# Patient Record
Sex: FEMALE | Race: WHITE | Employment: FULL TIME | URBAN - METROPOLITAN AREA
[De-identification: names, ages, dates, MRNs, and addresses within clinical notes are randomized per-mention and may not be internally consistent; named-entity substitution may affect disease eponyms.]

---

## 2017-07-28 PROBLEM — R97.1 ELEVATED CA-125: Status: ACTIVE | Noted: 2017-07-28

## 2017-07-28 PROBLEM — R19.00 PELVIC MASS: Status: ACTIVE | Noted: 2017-07-28

## 2017-07-28 PROBLEM — N91.1 SECONDARY AMENORRHEA: Status: ACTIVE | Noted: 2017-07-28

## 2017-08-10 ENCOUNTER — ANESTHESIA EVENT (OUTPATIENT)
Dept: SURGERY | Facility: CLINIC | Age: 27
End: 2017-08-10
Payer: OTHER GOVERNMENT

## 2017-08-10 ENCOUNTER — HOSPITAL ENCOUNTER (OUTPATIENT)
Facility: CLINIC | Age: 27
Discharge: HOME | End: 2017-08-10
Attending: OBSTETRICS & GYNECOLOGY | Admitting: OBSTETRICS & GYNECOLOGY
Payer: OTHER GOVERNMENT

## 2017-08-10 ENCOUNTER — ANESTHESIA (OUTPATIENT)
Dept: SURGERY | Facility: CLINIC | Age: 27
End: 2017-08-10
Payer: OTHER GOVERNMENT

## 2017-08-10 ENCOUNTER — SURGERY (OUTPATIENT)
Age: 27
End: 2017-08-10

## 2017-08-10 VITALS
SYSTOLIC BLOOD PRESSURE: 131 MMHG | WEIGHT: 132.4 LBS | HEIGHT: 68 IN | RESPIRATION RATE: 12 BRPM | BODY MASS INDEX: 20.07 KG/M2 | DIASTOLIC BLOOD PRESSURE: 86 MMHG | TEMPERATURE: 98.3 F | OXYGEN SATURATION: 99 %

## 2017-08-10 DIAGNOSIS — R19.00 PELVIC MASS: Primary | ICD-10-CM

## 2017-08-10 LAB — HCG SERPL QL: NEGATIVE

## 2017-08-10 PROCEDURE — 25000128 H RX IP 250 OP 636: Performed by: OBSTETRICS & GYNECOLOGY

## 2017-08-10 PROCEDURE — 36415 COLL VENOUS BLD VENIPUNCTURE: CPT | Performed by: OBSTETRICS & GYNECOLOGY

## 2017-08-10 PROCEDURE — 88305 TISSUE EXAM BY PATHOLOGIST: CPT | Performed by: OBSTETRICS & GYNECOLOGY

## 2017-08-10 PROCEDURE — 71000013 ZZH RECOVERY PHASE 1 LEVEL 1 EA ADDTL HR: Performed by: OBSTETRICS & GYNECOLOGY

## 2017-08-10 PROCEDURE — 25000128 H RX IP 250 OP 636: Performed by: ANESTHESIOLOGY

## 2017-08-10 PROCEDURE — 71000027 ZZH RECOVERY PHASE 2 EACH 15 MINS: Performed by: OBSTETRICS & GYNECOLOGY

## 2017-08-10 PROCEDURE — 88305 TISSUE EXAM BY PATHOLOGIST: CPT | Mod: 26 | Performed by: OBSTETRICS & GYNECOLOGY

## 2017-08-10 PROCEDURE — 37000008 ZZH ANESTHESIA TECHNICAL FEE, 1ST 30 MIN: Performed by: OBSTETRICS & GYNECOLOGY

## 2017-08-10 PROCEDURE — 27211024 ZZHC OR SUPPLY OTHER OPNP: Performed by: OBSTETRICS & GYNECOLOGY

## 2017-08-10 PROCEDURE — 37000009 ZZH ANESTHESIA TECHNICAL FEE, EACH ADDTL 15 MIN: Performed by: OBSTETRICS & GYNECOLOGY

## 2017-08-10 PROCEDURE — 25000125 ZZHC RX 250

## 2017-08-10 PROCEDURE — 25000566 ZZH SEVOFLURANE, EA 15 MIN: Performed by: OBSTETRICS & GYNECOLOGY

## 2017-08-10 PROCEDURE — 25000125 ZZHC RX 250: Performed by: OBSTETRICS & GYNECOLOGY

## 2017-08-10 PROCEDURE — 25000132 ZZH RX MED GY IP 250 OP 250 PS 637: Performed by: NURSE PRACTITIONER

## 2017-08-10 PROCEDURE — 84703 CHORIONIC GONADOTROPIN ASSAY: CPT | Performed by: OBSTETRICS & GYNECOLOGY

## 2017-08-10 PROCEDURE — 36000087 ZZH SURGERY LEVEL 8 EA 15 ADDTL MIN: Performed by: OBSTETRICS & GYNECOLOGY

## 2017-08-10 PROCEDURE — 71000012 ZZH RECOVERY PHASE 1 LEVEL 1 FIRST HR: Performed by: OBSTETRICS & GYNECOLOGY

## 2017-08-10 PROCEDURE — 25800025 ZZH RX 258: Performed by: OBSTETRICS & GYNECOLOGY

## 2017-08-10 PROCEDURE — C1765 ADHESION BARRIER: HCPCS | Performed by: OBSTETRICS & GYNECOLOGY

## 2017-08-10 PROCEDURE — 27210995 ZZH RX 272: Performed by: OBSTETRICS & GYNECOLOGY

## 2017-08-10 PROCEDURE — 36000085 ZZH SURGERY LEVEL 8 1ST 30 MIN: Performed by: OBSTETRICS & GYNECOLOGY

## 2017-08-10 PROCEDURE — 40000170 ZZH STATISTIC PRE-PROCEDURE ASSESSMENT II: Performed by: OBSTETRICS & GYNECOLOGY

## 2017-08-10 PROCEDURE — 25000128 H RX IP 250 OP 636

## 2017-08-10 PROCEDURE — 27210794 ZZH OR GENERAL SUPPLY STERILE: Performed by: OBSTETRICS & GYNECOLOGY

## 2017-08-10 RX ORDER — SODIUM CHLORIDE, SODIUM LACTATE, POTASSIUM CHLORIDE, CALCIUM CHLORIDE 600; 310; 30; 20 MG/100ML; MG/100ML; MG/100ML; MG/100ML
INJECTION, SOLUTION INTRAVENOUS CONTINUOUS PRN
Status: DISCONTINUED | OUTPATIENT
Start: 2017-08-10 | End: 2017-08-10

## 2017-08-10 RX ORDER — PROPOFOL 10 MG/ML
INJECTION, EMULSION INTRAVENOUS CONTINUOUS PRN
Status: DISCONTINUED | OUTPATIENT
Start: 2017-08-10 | End: 2017-08-10

## 2017-08-10 RX ORDER — MAGNESIUM HYDROXIDE 1200 MG/15ML
LIQUID ORAL PRN
Status: DISCONTINUED | OUTPATIENT
Start: 2017-08-10 | End: 2017-08-10 | Stop reason: HOSPADM

## 2017-08-10 RX ORDER — ONDANSETRON 2 MG/ML
4 INJECTION INTRAMUSCULAR; INTRAVENOUS EVERY 30 MIN PRN
Status: DISCONTINUED | OUTPATIENT
Start: 2017-08-10 | End: 2017-08-10 | Stop reason: HOSPADM

## 2017-08-10 RX ORDER — ONDANSETRON 4 MG/1
4 TABLET, ORALLY DISINTEGRATING ORAL EVERY 30 MIN PRN
Status: DISCONTINUED | OUTPATIENT
Start: 2017-08-10 | End: 2017-08-10 | Stop reason: HOSPADM

## 2017-08-10 RX ORDER — ALBUTEROL SULFATE 0.83 MG/ML
2.5 SOLUTION RESPIRATORY (INHALATION) EVERY 4 HOURS PRN
Status: DISCONTINUED | OUTPATIENT
Start: 2017-08-10 | End: 2017-08-10 | Stop reason: HOSPADM

## 2017-08-10 RX ORDER — FENTANYL CITRATE 50 UG/ML
INJECTION, SOLUTION INTRAMUSCULAR; INTRAVENOUS PRN
Status: DISCONTINUED | OUTPATIENT
Start: 2017-08-10 | End: 2017-08-10

## 2017-08-10 RX ORDER — SODIUM CHLORIDE, SODIUM LACTATE, POTASSIUM CHLORIDE, CALCIUM CHLORIDE 600; 310; 30; 20 MG/100ML; MG/100ML; MG/100ML; MG/100ML
INJECTION, SOLUTION INTRAVENOUS CONTINUOUS
Status: DISCONTINUED | OUTPATIENT
Start: 2017-08-10 | End: 2017-08-10 | Stop reason: HOSPADM

## 2017-08-10 RX ORDER — SENNOSIDES A AND B 8.6 MG/1
1-3 TABLET, FILM COATED ORAL 2 TIMES DAILY PRN
Qty: 30 TABLET | Refills: 0 | Status: SHIPPED | OUTPATIENT
Start: 2017-08-10

## 2017-08-10 RX ORDER — LIDOCAINE HYDROCHLORIDE 20 MG/ML
INJECTION, SOLUTION INFILTRATION; PERINEURAL PRN
Status: DISCONTINUED | OUTPATIENT
Start: 2017-08-10 | End: 2017-08-10

## 2017-08-10 RX ORDER — HYDROCODONE BITARTRATE AND ACETAMINOPHEN 5; 325 MG/1; MG/1
1-2 TABLET ORAL EVERY 4 HOURS PRN
Qty: 30 TABLET | Refills: 0 | Status: SHIPPED | OUTPATIENT
Start: 2017-08-10

## 2017-08-10 RX ORDER — FENTANYL CITRATE 50 UG/ML
25-50 INJECTION, SOLUTION INTRAMUSCULAR; INTRAVENOUS
Status: DISCONTINUED | OUTPATIENT
Start: 2017-08-10 | End: 2017-08-10 | Stop reason: HOSPADM

## 2017-08-10 RX ORDER — CEFAZOLIN SODIUM 2 G/100ML
2 INJECTION, SOLUTION INTRAVENOUS
Status: COMPLETED | OUTPATIENT
Start: 2017-08-10 | End: 2017-08-10

## 2017-08-10 RX ORDER — KETOROLAC TROMETHAMINE 30 MG/ML
INJECTION, SOLUTION INTRAMUSCULAR; INTRAVENOUS PRN
Status: DISCONTINUED | OUTPATIENT
Start: 2017-08-10 | End: 2017-08-10

## 2017-08-10 RX ORDER — NALOXONE HYDROCHLORIDE 0.4 MG/ML
.1-.4 INJECTION, SOLUTION INTRAMUSCULAR; INTRAVENOUS; SUBCUTANEOUS
Status: DISCONTINUED | OUTPATIENT
Start: 2017-08-10 | End: 2017-08-10 | Stop reason: HOSPADM

## 2017-08-10 RX ORDER — GLYCOPYRROLATE 0.2 MG/ML
INJECTION, SOLUTION INTRAMUSCULAR; INTRAVENOUS PRN
Status: DISCONTINUED | OUTPATIENT
Start: 2017-08-10 | End: 2017-08-10

## 2017-08-10 RX ORDER — IBUPROFEN 600 MG/1
600 TABLET, FILM COATED ORAL
Status: DISCONTINUED | OUTPATIENT
Start: 2017-08-10 | End: 2017-08-10 | Stop reason: HOSPADM

## 2017-08-10 RX ORDER — DEXAMETHASONE SODIUM PHOSPHATE 4 MG/ML
INJECTION, SOLUTION INTRA-ARTICULAR; INTRALESIONAL; INTRAMUSCULAR; INTRAVENOUS; SOFT TISSUE PRN
Status: DISCONTINUED | OUTPATIENT
Start: 2017-08-10 | End: 2017-08-10

## 2017-08-10 RX ORDER — HYDROMORPHONE HYDROCHLORIDE 1 MG/ML
.3-.5 INJECTION, SOLUTION INTRAMUSCULAR; INTRAVENOUS; SUBCUTANEOUS EVERY 10 MIN PRN
Status: DISCONTINUED | OUTPATIENT
Start: 2017-08-10 | End: 2017-08-10 | Stop reason: HOSPADM

## 2017-08-10 RX ORDER — PROPOFOL 10 MG/ML
INJECTION, EMULSION INTRAVENOUS PRN
Status: DISCONTINUED | OUTPATIENT
Start: 2017-08-10 | End: 2017-08-10

## 2017-08-10 RX ORDER — DESOGESTREL AND ETHINYL ESTRADIOL 0.15-0.03
1 KIT ORAL DAILY
COMMUNITY

## 2017-08-10 RX ORDER — LABETALOL HYDROCHLORIDE 5 MG/ML
10 INJECTION, SOLUTION INTRAVENOUS
Status: DISCONTINUED | OUTPATIENT
Start: 2017-08-10 | End: 2017-08-10 | Stop reason: HOSPADM

## 2017-08-10 RX ORDER — NEOSTIGMINE METHYLSULFATE 1 MG/ML
VIAL (ML) INJECTION PRN
Status: DISCONTINUED | OUTPATIENT
Start: 2017-08-10 | End: 2017-08-10

## 2017-08-10 RX ORDER — SCOLOPAMINE TRANSDERMAL SYSTEM 1 MG/1
1 PATCH, EXTENDED RELEASE TRANSDERMAL
Status: DISCONTINUED | OUTPATIENT
Start: 2017-08-10 | End: 2017-08-10 | Stop reason: HOSPADM

## 2017-08-10 RX ORDER — IBUPROFEN 600 MG/1
600 TABLET, FILM COATED ORAL EVERY 6 HOURS PRN
Qty: 40 TABLET | Refills: 1 | Status: SHIPPED | OUTPATIENT
Start: 2017-08-10

## 2017-08-10 RX ORDER — BUPIVACAINE HYDROCHLORIDE AND EPINEPHRINE 5; 5 MG/ML; UG/ML
INJECTION, SOLUTION PERINEURAL PRN
Status: DISCONTINUED | OUTPATIENT
Start: 2017-08-10 | End: 2017-08-10 | Stop reason: HOSPADM

## 2017-08-10 RX ORDER — MEPERIDINE HYDROCHLORIDE 25 MG/ML
12.5 INJECTION INTRAMUSCULAR; INTRAVENOUS; SUBCUTANEOUS
Status: DISCONTINUED | OUTPATIENT
Start: 2017-08-10 | End: 2017-08-10 | Stop reason: HOSPADM

## 2017-08-10 RX ORDER — ONDANSETRON 2 MG/ML
INJECTION INTRAMUSCULAR; INTRAVENOUS PRN
Status: DISCONTINUED | OUTPATIENT
Start: 2017-08-10 | End: 2017-08-10

## 2017-08-10 RX ORDER — SCOLOPAMINE TRANSDERMAL SYSTEM 1 MG/1
PATCH, EXTENDED RELEASE TRANSDERMAL PRN
Status: DISCONTINUED | OUTPATIENT
Start: 2017-08-10 | End: 2017-08-10

## 2017-08-10 RX ORDER — CEFAZOLIN SODIUM 1 G/3ML
1 INJECTION, POWDER, FOR SOLUTION INTRAMUSCULAR; INTRAVENOUS SEE ADMIN INSTRUCTIONS
Status: DISCONTINUED | OUTPATIENT
Start: 2017-08-10 | End: 2017-08-10 | Stop reason: HOSPADM

## 2017-08-10 RX ORDER — OXYCODONE AND ACETAMINOPHEN 5; 325 MG/1; MG/1
1-2 TABLET ORAL
Status: COMPLETED | OUTPATIENT
Start: 2017-08-10 | End: 2017-08-10

## 2017-08-10 RX ORDER — HYDRALAZINE HYDROCHLORIDE 20 MG/ML
2.5-5 INJECTION INTRAMUSCULAR; INTRAVENOUS EVERY 10 MIN PRN
Status: DISCONTINUED | OUTPATIENT
Start: 2017-08-10 | End: 2017-08-10 | Stop reason: HOSPADM

## 2017-08-10 RX ADMIN — HYDROMORPHONE HYDROCHLORIDE 0.5 MG: 1 INJECTION, SOLUTION INTRAMUSCULAR; INTRAVENOUS; SUBCUTANEOUS at 09:50

## 2017-08-10 RX ADMIN — KETOROLAC TROMETHAMINE 30 MG: 30 INJECTION, SOLUTION INTRAMUSCULAR at 11:17

## 2017-08-10 RX ADMIN — FENTANYL CITRATE 50 MCG: 50 INJECTION, SOLUTION INTRAMUSCULAR; INTRAVENOUS at 11:06

## 2017-08-10 RX ADMIN — PHENYLEPHRINE HYDROCHLORIDE 100 MCG: 10 INJECTION, SOLUTION INTRAMUSCULAR; INTRAVENOUS; SUBCUTANEOUS at 09:39

## 2017-08-10 RX ADMIN — ONDANSETRON 4 MG: 2 INJECTION INTRAMUSCULAR; INTRAVENOUS at 11:14

## 2017-08-10 RX ADMIN — SCOPOLAMINE 1 PATCH: 1 PATCH, EXTENDED RELEASE TRANSDERMAL at 09:55

## 2017-08-10 RX ADMIN — METHYLENE BLUE 10 ML: 5 INJECTION INTRAVENOUS at 11:20

## 2017-08-10 RX ADMIN — GLYCOPYRROLATE 0.4 MG: 0.2 INJECTION, SOLUTION INTRAMUSCULAR; INTRAVENOUS at 11:20

## 2017-08-10 RX ADMIN — MIDAZOLAM HYDROCHLORIDE 2 MG: 1 INJECTION, SOLUTION INTRAMUSCULAR; INTRAVENOUS at 09:18

## 2017-08-10 RX ADMIN — FENTANYL CITRATE 50 MCG: 50 INJECTION, SOLUTION INTRAMUSCULAR; INTRAVENOUS at 09:47

## 2017-08-10 RX ADMIN — DEXAMETHASONE SODIUM PHOSPHATE 4 MG: 4 INJECTION, SOLUTION INTRA-ARTICULAR; INTRALESIONAL; INTRAMUSCULAR; INTRAVENOUS; SOFT TISSUE at 09:39

## 2017-08-10 RX ADMIN — CEFAZOLIN SODIUM 2 G: 2 INJECTION, SOLUTION INTRAVENOUS at 09:35

## 2017-08-10 RX ADMIN — PROPOFOL 50 MCG/KG/MIN: 10 INJECTION, EMULSION INTRAVENOUS at 09:25

## 2017-08-10 RX ADMIN — SODIUM CHLORIDE 1000 ML: 900 IRRIGANT IRRIGATION at 09:45

## 2017-08-10 RX ADMIN — SODIUM CHLORIDE 1000 ML: 0.9 IRRIGANT IRRIGATION at 09:11

## 2017-08-10 RX ADMIN — FENTANYL CITRATE 50 MCG: 50 INJECTION, SOLUTION INTRAMUSCULAR; INTRAVENOUS at 09:50

## 2017-08-10 RX ADMIN — ONDANSETRON 4 MG: 2 SOLUTION INTRAMUSCULAR; INTRAVENOUS at 13:14

## 2017-08-10 RX ADMIN — ROCURONIUM BROMIDE 40 MG: 10 INJECTION INTRAVENOUS at 09:25

## 2017-08-10 RX ADMIN — NEOSTIGMINE METHYLSULFATE 3 MG: 1 INJECTION INTRAMUSCULAR; INTRAVENOUS; SUBCUTANEOUS at 11:20

## 2017-08-10 RX ADMIN — ROCURONIUM BROMIDE 10 MG: 10 INJECTION INTRAVENOUS at 10:46

## 2017-08-10 RX ADMIN — SODIUM CHLORIDE, POTASSIUM CHLORIDE, SODIUM LACTATE AND CALCIUM CHLORIDE: 600; 310; 30; 20 INJECTION, SOLUTION INTRAVENOUS at 09:18

## 2017-08-10 RX ADMIN — OXYCODONE HYDROCHLORIDE AND ACETAMINOPHEN 1 TABLET: 5; 325 TABLET ORAL at 13:29

## 2017-08-10 RX ADMIN — LIDOCAINE HYDROCHLORIDE 80 MG: 20 INJECTION, SOLUTION INFILTRATION; PERINEURAL at 09:25

## 2017-08-10 RX ADMIN — SODIUM CHLORIDE, POTASSIUM CHLORIDE, SODIUM LACTATE AND CALCIUM CHLORIDE: 600; 310; 30; 20 INJECTION, SOLUTION INTRAVENOUS at 11:24

## 2017-08-10 RX ADMIN — PROPOFOL 200 MG: 10 INJECTION, EMULSION INTRAVENOUS at 09:25

## 2017-08-10 RX ADMIN — BUPIVACAINE HYDROCHLORIDE AND EPINEPHRINE BITARTRATE 36 ML: 5; .005 INJECTION, SOLUTION PERINEURAL at 09:48

## 2017-08-10 RX ADMIN — FENTANYL CITRATE 100 MCG: 50 INJECTION, SOLUTION INTRAMUSCULAR; INTRAVENOUS at 09:25

## 2017-08-10 NOTE — IP AVS SNAPSHOT
MRN:[id number]                      After Visit Summary   8/10/2017    Simi Yang    MRN: [id number]           Thank you!     Thank you for choosing [facility] for your care. Our goal is always to provide you with excellent care. Hearing back from our patients is one way we can continue to improve our services. Please take a few minutes to complete the written survey that you may receive in the mail after you visit with us. Thank you!        Patient Information     Date Of Birth          1990        About your hospital stay     You were admitted on:  August 10, 2017 You last received care in the:  [facility] PACU    You were discharged on:  August 10, 2017       Who to Call     For medical emergencies, please call 911.  For non-urgent questions about your medical care, please call your primary care provider or clinic, [phone]  For questions related to your surgery, please call your surgery clinic        Attending Provider     Provider Arabella Deras MD Oncology       Primary Care Provider Office Phone # Fax #    Scarlett Bueno [phone]      After Care Instructions     Discharge Instructions       Discharge instructions following your gynecologic procedure:  Returning to work/activities:  -Typically patients can expect to return to light work within 2 weeks.  -No driving or operating machinery while taking prescription pain medication.  -You should avoid heavy lifting until your follow up visit. No lifting greater than 20 pounds for 2 weeks.     Diet:  -as tolerated    Pain:  -Motrin (or other NSAIDs) are a good additional therapy and recommend trying this in addition to other pain relievers.  -Typically there is a minimal to moderate amount of incisional pain after surgery.  - An ice pack is recommended intermittently for the first 48 hours to help reduce pain & swelling.  -Most patients are provided a prescription for medication to  take on a short term basis to help relieve the discomfort.  -If pain medication refills are needed we require you contact our office during regular business hours. (8am-5pm Monday-Friday)  -Please be aware that pain medication can cause constipation.  It may be recommended to take an over the counter stool softener as directed to prevent constipation.     Constipation:  -The pain medication you are prescribed at the time of your surgery can cause constipation.   -We recommend that you take an over the counter stool softener such as colace or senokot-s on a regular basis until you have stopped the pain medication or bowel movements are regular. You make take 1-4 tablets twice per day.   -For constipation lasting 3 days please take Milk of Magnesia or Miralax as directed on the bottles. If you have taken Milk of Magnesia and Miralax and still have not had a bowel movement please contact your our office.    Incision/Wound care:  -Leave your steri-strips in place until your post op visit.   -If you have a clear plastic dressing over a gauze on your incision, remove these 1-2 days after discharging from the hospital.   -You many shower 24hrs after surgery.  It is ok to get the steri-strips/incision wet while showering & pat the area dry with a clean towel  -No bathtubs, hot tubs or swimming is recommended for at least 2 weeks.      Follow up care:  -You will be asked to see Dr. Perrin's Nurse Practitioner in 1 week and Dr. Perrin in 8 weeks.   -If you don't already have an appointment, please contact the office and our staff will happily assist you in scheduling your appointment. Bring your insurance card & government issued ID card to your appointment.    CALL YOUR SURGEON @[phone] FOR ANY OF THE FOLLOWING:  -Temperature greater than 101 degrees Fahrenheit  -Increased pain  -Increased shortness of breath  -Increased bleeding or drainage or vaginal bleeding greater than a regular menses.   -Pus-like drainage,  increasing redness, swelling, tenderness, or warmth at the incision site  -Persistent nausea or vomiting                  Further instructions from your care team       Same Day Surgery Discharge Instructions for  Sedation and General Anesthesia       It's not unusual to feel dizzy, light-headed or faint for up to 24 hours after surgery or while taking pain medication.  If you have these symptoms: sit for a few minutes before standing and have someone assist you when you get up to walk or use the bathroom.      You should rest and relax for the next 24 hours. We recommend you make arrangements to have an adult stay with you for at least 24 hours after your discharge.  Avoid hazardous and strenuous activity.      DO NOT DRIVE any vehicle or operate mechanical equipment for 24 hours following the end of your surgery.  Even though you may feel normal, your reactions may be affected by the medication you have received.      Do not drink alcoholic beverages for 24 hours following surgery.       Slowly progress to your regular diet as you feel able. It's not unusual to feel nauseated and/or vomit after receiving anesthesia.  If you develop these symptoms, drink clear liquids (apple juice, ginger ale, broth, 7-up, etc. ) until you feel better.  If your nausea and vomiting persists for 24 hours, please notify your surgeon.        All narcotic pain medications, along with inactivity and anesthesia, can cause constipation. Drinking plenty of liquids and increasing fiber intake will help.      For any questions of a medical nature, call your surgeon.      Do not make important decisions for 24 hours.      If you had general anesthesia, you may have a sore throat for a couple of days related to the breathing tube used during surgery.  You may use Cepacol lozenges to help with this discomfort.  If it worsens or if you develop a fever, contact your surgeon.       If you feel your pain is not well managed with the pain medications  "prescribed by your surgeon, please contact your surgeon's office to let them know so they can address your concerns.       Information for Patients Discharging with a Transderm Scopolamine Patch       Dry mouth is a common side effect.    Drowsiness is another common side effect especially when combined with pain medication.  Please avoid activities that require mental alertness such as driving a car or making important legal decisions.    Since Scopolamine can cause temporary dilation of the pupils and blurred vision if it comes in contact with the eyes; be sure to wash your hands thoroughly with soap and water immediately after handling the patch.   When you remove your patch, please stick it to a tissue or paper towel for disposal.      Remove the patch immediately and contact a physician in the unlikely event that you experience symptoms of acute glaucoma (pain and reddening of the eyes, accompanied by dilated pupils).    Remove the patch if you develop any difficulties urinating.  If you cannot urinate after removing your patch, please notify your surgeon.    Remove the patch 24 hours after surgery.      While you were at the hospital today you received Toradol, an antiinflammatory medication similar to Ibuprofen.  You should not take other antiinflammatory medication, such as Ibuprofen, Motrin, Advil, Aleve, Naprosyn, etc, until 5:15pm.           Pending Results     Date and Time Order Name Status Description    8/10/2017 1033 Surgical pathology exam In process             Admission Information     Date & Time Provider Department Dept. Phone    8/10/2017 Arabella Perrin MD [facility] PACU [phone]      Your Vitals Were     Blood Pressure Temperature Respirations Height Weight Last Period    133/87 98.3  F (36.8  C) (Temporal) 14 1.727 m (5' 8\") 60.1 kg (132 lb 6.4 oz) 07/24/2017    Pulse Oximetry BMI (Body Mass Index)                98% 20.13 kg/m2          MyChart Information     MyChart " "lets you send messages to your doctor, view your test results, renew your prescriptions, schedule appointments and more. To sign up, go to [email or web address] . Click on \"Log in\" on the left side of the screen, which will take you to the Welcome page. Then click on \"Sign up Now\" on the right side of the page.     You will be asked to enter the access code listed below, as well as some personal information. Please follow the directions to create your username and password.     Your access code is: [email or web address]  Expires: 2017 12:02 PM     Your access code will  in 90 days. If you need help or a new code, please call your [facility] clinic or [phone].        Care EveryWhere ID     This is your Care EveryWhere ID. This could be used by other organizations to access your [facility] medical records  [id number]        Equal Access to Services     SUSAN RONDON : Brenda Jain, walee hernandez, justa dumont, ace green . So [facility] [phone].    ATENCIÓN: Si habla español, tiene a claudio disposición servicios gratuitos de asistencia lingüística. Llbob al [phone].    We comply with applicable federal civil rights laws and [place] laws. We do not discriminate on the basis of race, color, national origin, age, disability sex, sexual orientation or gender identity.               Review of your medicines      START taking        Dose / Directions    HYDROcodone-acetaminophen 5-325 MG per tablet   Commonly known as:  NORCO   Used for:  Pelvic mass        Dose:  1-2 tablet   Take 1-2 tablets by mouth every 4 hours as needed for moderate to severe pain   Quantity:  30 tablet   Refills:  0       ibuprofen 600 MG tablet   Commonly known as:  ADVIL/MOTRIN   Used for:  Pelvic mass   Notes to Patient:  While you were at the hospital today you received Toradol, an antiinflammatory medication similar to Ibuprofen.  You should not take other antiinflammatory " medication, such as Ibuprofen, Motrin, Advil, Aleve, Naprosyn, etc, until 5:15pm.         Dose:  600 mg   Take 1 tablet (600 mg) by mouth every 6 hours as needed for moderate pain   Quantity:  40 tablet   Refills:  1       senna 8.6 MG tablet   Commonly known as:  SENOKOT   Used for:  Pelvic mass        Dose:  1-3 tablet   Take 1-3 tablets by mouth 2 times daily as needed for constipation   Quantity:  30 tablet   Refills:  0         CONTINUE these medicines which have NOT CHANGED        Dose / Directions    BENADRYL PO        Dose:  25 mg   Take 25 mg by mouth every 6 hours as needed   Refills:  0       desogestrel-ethinyl estradiol 0.15-30 MG-MCG per tablet   Commonly known as:  APRI        Dose:  1 tablet   Take 1 tablet by mouth daily   Refills:  0            Where to get your medicines      These medications were sent to [facility] - [address]  [address], [place] [address]     Phone:  [phone]     ibuprofen 600 MG tablet    senna 8.6 MG tablet         Some of these will need a paper prescription and others can be bought over the counter. Ask your nurse if you have questions.     Bring a paper prescription for each of these medications     HYDROcodone-acetaminophen 5-325 MG per tablet                Protect others around you: Learn how to safely use, store and throw away your medicines at www.disposemymeds.org.             Medication List: This is a list of all your medications and when to take them. Check marks below indicate your daily home schedule. Keep this list as a reference.      Medications           Morning Afternoon Evening Bedtime As Needed    BENADRYL PO   Take 25 mg by mouth every 6 hours as needed                                desogestrel-ethinyl estradiol 0.15-30 MG-MCG per tablet   Commonly known as:  APRI   Take 1 tablet by mouth daily                                HYDROcodone-acetaminophen 5-325 MG per tablet   Commonly known as:  NORCO    Take 1-2 tablets by mouth every 4 hours as needed for moderate to severe pain                                ibuprofen 600 MG tablet   Commonly known as:  ADVIL/MOTRIN   Take 1 tablet (600 mg) by mouth every 6 hours as needed for moderate pain   Notes to Patient:  While you were at the hospital today you received Toradol, an antiinflammatory medication similar to Ibuprofen.  You should not take other antiinflammatory medication, such as Ibuprofen, Motrin, Advil, Aleve, Naprosyn, etc, until 5:15pm.                                 senna 8.6 MG tablet   Commonly known as:  SENOKOT   Take 1-3 tablets by mouth 2 times daily as needed for constipation

## 2017-08-10 NOTE — OP NOTE
DATE OF PROCEDURE:  08/10/2017.      PREOPERATIVE DIAGNOSES:  Bilateral pelvic masses, elevated CA-125.      POSTOPERATIVE DIAGNOSES:  Bilateral ovarian endometriomas, pelvic and abdominal endometriosis.      SURGEON:  PRATIMA Perrin MD      ASSISTANT:  VINITA Sheridan, CNP      ANESTHESIA:  General endotracheal.      PROCEDURES:  Robotic-assisted laparoscopic bilateral ovarian cystectomies, lysis of adhesions and tubal dye studies.      INDICATIONS FOR THE PROCEDURE:  Simi Yang is a 26-year-old female who stopped oral contraceptive pills 2 years ago.  She had normal menses until 05/2016 when they spaced out and then stopped.  In 09/2016, workup for amenorrhea was commenced and she had a normal FSH, estradiol, TSH, prolactin and negative hCG.  She was started on OCPs with resumption of intermittent menses.  A pelvic ultrasound was obtained 02/06/2017 and revealed bilateral complex adnexal masses, 8.3 x 6.6 x 7.6 on the right and 3.3 x 2.5 x 2.6 cm on the left, findings consistent with endometriomas.  A repeat pelvic ultrasound 3 months later showed an increase in bilateral adnexal masses consistent with endometriomas, 8.8 cm on the right and 4.5 and 2 cm on the left.  She stopped her OCPs and again had no menses.  She was evaluated by Dr. Glez on 06/30/2017 and had bilateral adnexal fullness.  A CA-125 was markedly elevated at 867.8 units/mL.  Because of the issues of amenorrhea and infertility, in addition to evaluating her for the ovarian cyst, we felt based on Dr. Glez' recommendations that she should have a tubal dye study as well.      FINDINGS:  The patient did have hemosiderin implants throughout the omentum on the diaphragm surfaces and on the vesicouterine peritoneum and pelvic peritoneum.  She had abnormally enlarged ovaries with a fairly thick cortex and these were stuck down by adhesions to the pelvic peritoneum.  There was no obliteration of the cul-de-sac.  Her colon was  normal, as was her small bowel.  Tubal dye study showed a slight delay on the right-hand side, but eventual spill from both fallopian tubes.  Both ovarian cysts were entered and contained old hemorrhagic fluid consistent with endometriomas and the endometrioma cysts shelled out nicely out of the ovaries and the ovaries were restructured.      PROCEDURE IN DETAIL:  The patient was taken to the operating room.  She received Ancef as antibiotic prophylaxis.  Knee-high sequential compression devices were placed on her lower extremities.  She was placed on a pink pad on the operating room table in a supine position.  General endotracheal anesthesia was administered in the usual fashion.  Once intubated, she was repositioned in low lithotomy position using Yellofins stirrups.  Her arms were padded and held at her sides with draw sheets.  Shoulder braces were applied to her shoulders and a Mahoney was placed above her forehead with a donut over her face.  She was prepped and draped in the usual sterile fashion.  A timeout was conducted and everyone agreed upon the procedure.  I started by infiltrating the supraumbilical area with 0.5% Marcaine with epinephrine.  A small nick was made in the skin.  A Veress needle was easily introduced into the peritoneal cavity.  Opening pressure was 2 mmHg.  The abdomen was insufflated with carbon dioxide to create a diffuse pneumoperitoneum.  Pressure limits were set and maintained at or below 15 mmHg.  With establishment of the pneumoperitoneum, the Veress needle was exchanged for an 8 mm da Yasmani trocar and the camera was then further introduced and confirmed intraperitoneal position.  It did show hemosiderin implants on the diaphragm surfaces, as well as in the omentum.  Under direct visualization, 4 additional port sites were placed, an 8 mm da Yasmani port was placed 8 cm to the right of the camera port in the upper abdomen, two 8 mm da Yasmani ports were placed 7 cm and 14 cm to the  left of the camera port in the upper abdomen.  The patient was then placed in steep Trendelenburg with gravitational displacement of her bowel into the upper abdomen.  A 12 mm VersaStep port was placed above the right anterior superior iliac spine.  We could see 2 large ovarian masses that were somewhat stuck down by adhesions to the pelvic peritoneum.  Her tubes appeared normal bilaterally, as did her uterus.  There were hemosiderin implants of the vesicouterine peritoneum as well.  At this juncture, I placed a Graves speculum in her vagina.  I visualized the cervix.  I grasped it with a single-tooth tenaculum and placed a Jarcho cannula into the endocervical canal and connected this to a tube of methylene blue and saline.  I then changed my gloves and went back up to the tableside.  We injected the blue dye.  There was immediate bubbling out of the left fallopian tube.  The right fallopian tube was a little slower and dilated first before there was direct spill of blue into the abdominal cavity, and by that time, both fallopian tubes had free flow of blue dye into the peritoneal cavity.  We used 20 mL of the fluid.  I now docked the robot in the usual fashion.  Monopolar scissors were placed in the right upper robotic arm, a [place] bipolar in the medial left upper robotic arm and a ProGrasp in the lateral left robotic arm.  We started on the left-hand side.  I grasped the round ligament with the ProGrasp.  I used some blunt dissection to free up the ovarian mass from the pelvic peritoneum to which it was stuck.  In doing so, we inadvertently opened up the cyst and there was hemorrhagic, dark chocolate fluid inside consistent with endometrioma mass suspected.  The cyst was drained and the dark fluid was evacuated with the suction .  We then opened up the ovarian capsule a little bit more and we subsequently shelled out the cyst wall of the endometrioma.  I then surgically sutured the capsule of the ovary  closed with 3-0 Vicryl suture in a running fashion.  We then proceeded in a similar fashion over on the right-hand side, which was much, much larger, containing much more fluid, but consisted of really a single endometriotic cyst.  There had been 2 cysts on the left and we removed 1 on the right that was shelled out of the ovary as well.  This ovarian capsule on the right-hand side was also closed with 3-0 Vicryl suture in a running fashion.  The pelvis was irrigated.  We placed Interceed over the tubes and ovaries bilaterally to try to prevent them from adhering to other structures during the healing phase.  We closed the 12 mm fascial incision with a fascial closure device using 0 Vicryl suture.  The robotic instruments were removed.  The robot was undocked.  The pneumoperitoneum was allowed to dissipate and the trocars were removed intact.  The incisions were closed with 4-0 Monocryl in an interrupted fashion to reapproximate the subcutaneous tissue and 4-0 Monocryl in a subcuticular fashion to reapproximate the skin.  Mastisol was placed around the incisions, Steri-Strips laid over the incisions.  The patient's instruments from her vagina were removed.  Her anesthesia was reversed and she was extubated and taken to recovery room in stable condition.  Estimated blood loss 75 mL.      [place] was my primary assist throughout the case.  She helped with trocar insertion, with docking of the robot.  She assisted through the accessory port, providing necessary countertraction.  She was instrumental in specimen retrieval and helped with undocking the robot and port site closure.         ARABELLA MARTI MD             D: 08/10/2017 11:52   T: 08/10/2017 18:57   MT: TS      Name:     ALINE MOE   MRN:      [id number]        Account:        [id number]   :      1990           Procedure Date: 08/10/2017      Document: [id number]       cc: Arabella Glez MD

## 2017-08-10 NOTE — ANESTHESIA PREPROCEDURE EVALUATION
Anesthesia Evaluation     . Pt has had prior anesthetic.     No history of anesthetic complications          ROS/MED HX    ENT/Pulmonary:      (-) sleep apnea   Neurologic:       Cardiovascular:         METS/Exercise Tolerance:     Hematologic:         Musculoskeletal:         GI/Hepatic:        (-) GERD   Renal/Genitourinary: Comment: Ovarian cyst        Endo:         Psychiatric:     (+) psychiatric history anxiety      Infectious Disease:         Malignancy:         Other:                     Physical Exam  Normal systems: cardiovascular, pulmonary and dental    Airway   Mallampati: I  TM distance: >3 FB  Neck ROM: full    Dental     Cardiovascular   Rhythm and rate: regular and normal      Pulmonary    breath sounds clear to auscultation                    Anesthesia Plan      History & Physical Review  History and physical reviewed and following examination; no interval change.    ASA Status:  1 .    NPO Status:  > 8 hours    Plan for General and ETT with Intravenous induction. Maintenance will be Balanced.    PONV prophylaxis:  Ondansetron (or other 5HT-3), Dexamethasone or Solumedrol and Scopolamine patch (Propofol gtt)       Postoperative Care  Postoperative pain management:  IV analgesics and Oral pain medications.      Consents                      Procedure: Procedure(s):  DAVINCI XI CYSTECTOMY OVARIAN  LAPAROSCOPIC TUBAL DYE STUDY  Preop diagnosis: BILATERAL PELVIC MASSES    Allergies   Allergen Reactions     No Clinical Screening - See Comments Rash     BANDAID     Past Medical History:   Diagnosis Date     Anxiety      Seasonal allergies      Past Surgical History:   Procedure Laterality Date     ENT SURGERY      tonsillectomy,adenoidectomy, wisdom teeth removed     HERNIA REPAIR      umbilical     Prior to Admission medications    Medication Sig Start Date End Date Taking? Authorizing Provider   desogestrel-ethinyl estradiol (APRI) 0.15-30 MG-MCG per tablet Take 1 tablet by mouth daily   Yes  Reported, Patient   DiphenhydrAMINE HCl (BENADRYL PO) Take 25 mg by mouth every 6 hours as needed   Yes Reported, Patient     Current Facility-Administered Medications Ordered in Epic   Medication Dose Route Frequency Last Rate Last Dose     ceFAZolin sodium-dextrose (ANCEF) infusion 2 g  2 g Intravenous Pre-Op/Pre-procedure x 1 dose         ceFAZolin (ANCEF) 1 g vial to attach to  ml bag for ADULT or 50 ml bag for PEDS  1 g Intravenous See Admin Instructions         No current [facility]-ordered outpatient prescriptions on file.     Wt Readings from Last 1 Encounters:   08/10/17 60.1 kg (132 lb 6.4 oz)     Temp Readings from Last 1 Encounters:   08/10/17 36.6  C (97.9  F) (Oral)     BP Readings from Last 6 Encounters:   08/10/17 123/80     Pulse Readings from Last 4 Encounters:   No data found for Pulse     Resp Readings from Last 1 Encounters:   08/10/17 16     SpO2 Readings from Last 1 Encounters:   08/10/17 100%     No results for input(s): NA, POTASSIUM, CHLORIDE, CO2, ANIONGAP, GLC, BUN, CR, PAPITO in the last 43532 hours.  No results for input(s): AST, ALT in the last 97649 hours.    Invalid input(s): ALP, BILT, LPSE  No results for input(s): WBC, HGB, PLT in the last 95515 hours.  No results for input(s): INR in the last 35950 hours.    Invalid input(s): APTT   No results for input(s): TROPI in the last 08005 hours.  RECENT LABS:   ECG:   ECHO:   CXR:

## 2017-08-10 NOTE — DISCHARGE INSTRUCTIONS
Same Day Surgery Discharge Instructions for  Sedation and General Anesthesia       It's not unusual to feel dizzy, light-headed or faint for up to 24 hours after surgery or while taking pain medication.  If you have these symptoms: sit for a few minutes before standing and have someone assist you when you get up to walk or use the bathroom.      You should rest and relax for the next 24 hours. We recommend you make arrangements to have an adult stay with you for at least 24 hours after your discharge.  Avoid hazardous and strenuous activity.      DO NOT DRIVE any vehicle or operate mechanical equipment for 24 hours following the end of your surgery.  Even though you may feel normal, your reactions may be affected by the medication you have received.      Do not drink alcoholic beverages for 24 hours following surgery.       Slowly progress to your regular diet as you feel able. It's not unusual to feel nauseated and/or vomit after receiving anesthesia.  If you develop these symptoms, drink clear liquids (apple juice, ginger ale, broth, 7-up, etc. ) until you feel better.  If your nausea and vomiting persists for 24 hours, please notify your surgeon.        All narcotic pain medications, along with inactivity and anesthesia, can cause constipation. Drinking plenty of liquids and increasing fiber intake will help.      For any questions of a medical nature, call your surgeon.      Do not make important decisions for 24 hours.      If you had general anesthesia, you may have a sore throat for a couple of days related to the breathing tube used during surgery.  You may use Cepacol lozenges to help with this discomfort.  If it worsens or if you develop a fever, contact your surgeon.       If you feel your pain is not well managed with the pain medications prescribed by your surgeon, please contact your surgeon's office to let them know so they can address your concerns.       Information for Patients Discharging with a  Transderm Scopolamine Patch       Dry mouth is a common side effect.    Drowsiness is another common side effect especially when combined with pain medication.  Please avoid activities that require mental alertness such as driving a car or making important legal decisions.    Since Scopolamine can cause temporary dilation of the pupils and blurred vision if it comes in contact with the eyes; be sure to wash your hands thoroughly with soap and water immediately after handling the patch.   When you remove your patch, please stick it to a tissue or paper towel for disposal.      Remove the patch immediately and contact a physician in the unlikely event that you experience symptoms of acute glaucoma (pain and reddening of the eyes, accompanied by dilated pupils).    Remove the patch if you develop any difficulties urinating.  If you cannot urinate after removing your patch, please notify your surgeon.    Remove the patch 24 hours after surgery.      While you were at the hospital today you received Toradol, an antiinflammatory medication similar to Ibuprofen.  You should not take other antiinflammatory medication, such as Ibuprofen, Motrin, Advil, Aleve, Naprosyn, etc, until 5:15pm.

## 2017-08-10 NOTE — PROCEDURES
POST OPERATIVE NOTE-IMMEDIATE :  Preoperative Diagnosis:  BILATERAL PELVIC MASSES    Postoperative Diagnosis:  Endometriosis     NATIONAL GUIDELINE REFERENCED FOR TREATMENT PLANNING:NCCN    Procedures:  Robotic assisted laparoscopic  Bilateral ovarian cystectomy     Prosthetic Devices:  None    Surgeon(s) and Assistants (if any):  Surgeon(s):  Arabella Perrin MD Casey, Ann Catherine, MD  Circulator: Fredi Jacobo RN  Relief Circulator: Gayle Burton RN  Relief Scrub: Hiral Alvarez Austin  Scrub Person: Pat Sands  First Assistant: Funmi South APRN CNP    Anesthesia:  General    Drains:  none    Specimens:bilateral ovarian cysts, cul de sac cyst    Complications: none    Findings/Conclusions: B/l ovarian endometriosis, endometriosis scattered throughout pelvis and peritoneum.     Estimated Blood Loss: 75cc     Condition on discharge from OR:  Satisfactory      Funmi South   On Behalf of  Surgeon(s):  Arabella Perrin MD Casey, Ann Catherine, MD

## 2017-08-10 NOTE — IP AVS SNAPSHOT
[facility] [address]    [place] [address]    Phone:  [phone]                                       After Visit Summary   8/10/2017    Simi Yang    MRN: [id number]           After Visit Summary Signature Page     I have received my discharge instructions, and my questions have been answered. I have discussed any challenges I see with this plan with the nurse or doctor.    ..........................................................................................................................................  Patient/Patient Representative Signature      ..........................................................................................................................................  Patient Representative Print Name and Relationship to Patient    ..................................................               ................................................  Date                                            Time    ..........................................................................................................................................  Reviewed by Signature/Title    ...................................................              ..............................................  Date                                                            Time

## 2017-08-10 NOTE — ANESTHESIA POSTPROCEDURE EVALUATION
Patient: Simi Yang    Procedure(s):  ROBOTIC ASSISTED XI  BILATERAL OVARIAN CYSTECTOMY ; LAPAROSCOPIC TUBAL DYE STUDIES    - Wound Class: I-Clean   - Wound Class: I-Clean    Diagnosis:BILATERAL PELVIC MASSES  Diagnosis Additional Information: No value filed.    Anesthesia Type:  General, ETT    Note:  Anesthesia Post Evaluation    Patient location during evaluation: PACU  Patient participation: Able to fully participate in evaluation  Level of consciousness: awake  Pain management: adequate  Airway patency: patent  Cardiovascular status: acceptable  Respiratory status: acceptable  Hydration status: acceptable  PONV: none     Anesthetic complications: None          Last vitals:  Vitals:    08/10/17 0719 08/10/17 1148 08/10/17 1200   BP: 123/80 124/88 125/79   Resp: 16 16 17   Temp: 36.6  C (97.9  F) 36.8  C (98.3  F)    SpO2: 100% 100% 100%         Electronically Signed By: Mayelin Ram MD, MD  August 10, 2017  12:14 PM

## 2017-08-10 NOTE — ANESTHESIA CARE TRANSFER NOTE
Patient: Simi Yang    Procedure(s):  ROBOTIC ASSISTED XI  BILATERAL OVARIAN CYSTECTOMY ; LAPAROSCOPIC TUBAL DYE STUDIES    - Wound Class: I-Clean   - Wound Class: I-Clean    Diagnosis: BILATERAL PELVIC MASSES  Diagnosis Additional Information: No value filed.    Anesthesia Type:   General, ETT     Note:  Airway :Face Mask  Patient transferred to:PACU  Comments: Easily arousable.  Denies pain, N&V.  Report to RN.      Vitals: (Last set prior to Anesthesia Care Transfer)    CRNA VITALS  8/10/2017 1116 - 8/10/2017 1151      8/10/2017             Resp Rate (observed): 18                Electronically Signed By: Veronica Box  August 10, 2017  11:51 AM

## 2017-08-11 LAB — COPATH REPORT: NORMAL

## 2017-12-14 ENCOUNTER — DOCUMENTATION ONLY (OUTPATIENT)
Dept: OTHER | Facility: CLINIC | Age: 27
End: 2017-12-14

## 2017-12-14 PROBLEM — Z71.89 ADVANCED DIRECTIVES, COUNSELING/DISCUSSION: Chronic | Status: ACTIVE | Noted: 2017-12-14

## 2021-10-07 ENCOUNTER — ANCILLARY PROCEDURE (OUTPATIENT)
Dept: ULTRASOUND IMAGING | Facility: CLINIC | Age: 31
End: 2021-10-07
Payer: OTHER GOVERNMENT

## 2021-10-07 ENCOUNTER — ANCILLARY PROCEDURE (OUTPATIENT)
Dept: MAMMOGRAPHY | Facility: CLINIC | Age: 31
End: 2021-10-07
Payer: OTHER GOVERNMENT

## 2021-10-07 DIAGNOSIS — N63.10 LUMP OF RIGHT BREAST: ICD-10-CM

## 2021-10-07 PROCEDURE — 76642 ULTRASOUND BREAST LIMITED: CPT | Mod: LT

## 2021-10-07 PROCEDURE — 77066 DX MAMMO INCL CAD BI: CPT

## 2021-10-07 PROCEDURE — G0279 TOMOSYNTHESIS, MAMMO: HCPCS

## 2023-08-29 PROCEDURE — 88305 TISSUE EXAM BY PATHOLOGIST: CPT | Mod: 26 | Performed by: PATHOLOGY

## 2023-08-29 PROCEDURE — 88341 IMHCHEM/IMCYTCHM EA ADD ANTB: CPT | Mod: 26 | Performed by: PATHOLOGY

## 2023-08-29 PROCEDURE — 88342 IMHCHEM/IMCYTCHM 1ST ANTB: CPT | Mod: 26 | Performed by: PATHOLOGY

## 2023-08-29 PROCEDURE — 88305 TISSUE EXAM BY PATHOLOGIST: CPT | Mod: TC,ORL | Performed by: OBSTETRICS & GYNECOLOGY

## 2023-08-30 ENCOUNTER — LAB REQUISITION (OUTPATIENT)
Dept: LAB | Facility: CLINIC | Age: 33
End: 2023-08-30
Payer: OTHER GOVERNMENT

## 2023-09-05 LAB
PATH REPORT.COMMENTS IMP SPEC: NORMAL
PATH REPORT.FINAL DX SPEC: NORMAL
PATH REPORT.GROSS SPEC: NORMAL
PATH REPORT.MICROSCOPIC SPEC OTHER STN: NORMAL
PATH REPORT.MICROSCOPIC SPEC OTHER STN: NORMAL
PATH REPORT.RELEVANT HX SPEC: NORMAL
PHOTO IMAGE: NORMAL

## 2024-06-17 PROBLEM — Z71.89 ADVANCED DIRECTIVES, COUNSELING/DISCUSSION: Status: RESOLVED | Noted: 2017-12-14 | Resolved: 2024-06-17

## 2025-06-03 ENCOUNTER — TRANSFERRED RECORDS (OUTPATIENT)
Dept: HEALTH INFORMATION MANAGEMENT | Facility: CLINIC | Age: 35
End: 2025-06-03

## 2025-06-03 PROCEDURE — 88305 TISSUE EXAM BY PATHOLOGIST: CPT | Mod: 26 | Performed by: PATHOLOGY

## 2025-06-03 PROCEDURE — 88305 TISSUE EXAM BY PATHOLOGIST: CPT | Mod: TC,ORL | Performed by: OBSTETRICS & GYNECOLOGY

## 2025-06-04 ENCOUNTER — LAB REQUISITION (OUTPATIENT)
Dept: LAB | Facility: CLINIC | Age: 35
End: 2025-06-04
Payer: COMMERCIAL

## 2025-06-05 LAB
PATH REPORT.COMMENTS IMP SPEC: NORMAL
PATH REPORT.COMMENTS IMP SPEC: NORMAL
PATH REPORT.FINAL DX SPEC: NORMAL
PATH REPORT.GROSS SPEC: NORMAL
PATH REPORT.MICROSCOPIC SPEC OTHER STN: NORMAL
PATH REPORT.RELEVANT HX SPEC: NORMAL
PHOTO IMAGE: NORMAL

## 2025-06-16 ENCOUNTER — MEDICAL CORRESPONDENCE (OUTPATIENT)
Dept: HEALTH INFORMATION MANAGEMENT | Facility: CLINIC | Age: 35
End: 2025-06-16
Payer: COMMERCIAL

## 2025-06-16 ENCOUNTER — TRANSCRIBE ORDERS (OUTPATIENT)
Dept: OTHER | Age: 35
End: 2025-06-16

## 2025-06-16 DIAGNOSIS — N80.9 ENDOMETRIOSIS: Primary | ICD-10-CM

## 2025-06-16 DIAGNOSIS — N83.8 OVARIAN MASS: ICD-10-CM

## 2025-06-17 ENCOUNTER — PATIENT OUTREACH (OUTPATIENT)
Dept: ONCOLOGY | Facility: CLINIC | Age: 35
End: 2025-06-17
Payer: COMMERCIAL

## 2025-06-17 ENCOUNTER — PRE VISIT (OUTPATIENT)
Dept: ONCOLOGY | Facility: CLINIC | Age: 35
End: 2025-06-17
Payer: COMMERCIAL

## 2025-06-17 NOTE — PROGRESS NOTES
"New Patient Hematology / Oncology Nurse Navigator Note     Referral Date: 6/16/25    Referring provider:       Referring Clinic/Organization:        [facility]    Requested provider (if applicable): First available - did not specify     Evaluation for :          Clinical History (per Nurse review of records provided):    6/3/25 Op Note:    6/3/25 Path:   Final Diagnosis   A.  Endometrium, curettage:  Nonphasic endometrial mucosa, no evidence of hyperplasia or malignancy      Electronically signed by Ilir Herrera MD on 6/5/2025 at 1146 CDT   Clinical Information  [facility]   Dysmenorrhea, pelvic pain.   Gross Description  [facility]   A(). Uterus, :  The specimen is received in formalin, labeled with the patient's name, medical record number and other identifying information designated \"endometrial curettings\". It consists of a mesh medical collection sock containing numerous fragments of white-tan friable soft tissue measuring 1.6 cm in aggregate.  The fragments are submitted entirely in formalin wrapped in lens paper in one cassette.   ALEKSANDAR Petty(ASCP)CM 6/4/2025 8:16 AM        Clinical Assessment / Barriers to Care (Per Nurse):  Pt lives in [place], [place]    Records Location: Faxed - Media tab/Scanned     Records Needed:   Any imaging report/images with [facility]? Last PAP/HPV? Clinic notes from [facility]. Labs from [facility] (any tumor markers/?)    Images from [facility]    Records from [facility] in [place]. Please obtain pt authorization so we can view all relevant records via [place]    Additional testing needed prior to consult:   Pending review of complete records     Referral updates and Plan:   Referral received via fax with minimal records (Op note/path report) and reviewed by nursing. Flagged for records intake.    Will follow-up pending review of complete records.    Introduced my role as nurse navigator with [facility] and that we have recd the referral to [facility] from Dr. Glez, but are still working to obtain complete records.  Explained to pt that he/she will receive a call from our scheduling intake team and the records team will work to obtain the records.   Pt verbalized understanding and denied further questions, has my call-back number if needed.    Tentative hold placed on Dr. Torrez's schedule 6/24 0815 @ [facility] per pt preference.      Janelle Sterling, BSN, RN, PHN, OCN  Hematology/Oncology Nurse Navigator  [facility]  [phone]

## 2025-06-17 NOTE — TELEPHONE ENCOUNTER
RECORDS STATUS - ALL OTHER DIAGNOSIS      Action    Action Taken 6/17/25  Spoke w/ Pt - pt provided Verbal Consent for [place] records pull.    Pt advised upcoming appt pending scheduling @ [place]  Pt advised having been to [place] [facility] in the past.  Discussed need for [facility], emailed [facility]EL confirming email in [facility].  Pt advised imaging done @ [facility]  3:12 PM      RECORDS RECEIVED FROM:    DIAGNOSIS: Endometriosis [N80.9]; Ovarian mass [N83.8]   NOTES STATUS DETAILS   OFFICE NOTE from referring provider [facility] 6/17 Dr. Lydia Glez   OFFICE NOTE from medical oncologist     OFFICE NOTE from other specialist     DISCHARGE SUMMARY from hospital     DISCHARGE REPORT from the ER     OPERATIVE REPORT Ext: [facility] 06/03/25: Diagnostic laparoscopy, bilateral tubal dye study, operative hysteroscopy, TruClear directed dilation and curettage, and Liletta IUD placement   MEDICATION LIST     LABS     PATHOLOGY REPORTS Reports in Epic 06/03/25: [id number]  08/29/23: [id number]   ANYTHING RELATED TO DIAGNOSIS     PATHOLOGY FED[employer] TRACKING   Tracking #:   GENONOMIC TESTING     TYPE:     IMAGING (NEED IMAGES & REPORT)     CT SCANS Req 6/ 17 [facility]:  05/03/23: CT AP   MRI Req 6/ 17 [facility]:  10/13/23: MR Pelvis   XRAYS     ULTRASOUND     PET     IMAGE DISC [employer] TRACKING   Tracking #:

## 2025-06-18 ENCOUNTER — DOCUMENTATION ONLY (OUTPATIENT)
Dept: OTHER | Facility: CLINIC | Age: 35
End: 2025-06-18
Payer: COMMERCIAL

## 2025-06-18 NOTE — PROGRESS NOTES
Referring clinic called with contact information for Dr. Glez to arrange discussion with Dr. Torrez prior to visit Tuesday. Message to Dr. Torrez with cell number requesting call to D. Will follow-up as needed.     Janelle Sterling, JOSN, RN, PHN, OCN  Hematology/Oncology Nurse Navigator  [facility]  [phone]

## 2025-06-20 NOTE — PROGRESS NOTES
Gynecologic Oncology Clinic - New Patient    Referring provider:    Lydia Glez MD  [address] [place] [address]    Patient: Simi Yang  : 1990    Date of Visit: 2025     Reason for visit: Ovarian mass    History of Present Illness:  Simi Yang is a 34 year old patient with a history of stage IV endometriosis, now with a left complex ovarian mass.  She was referred to me due to extensive adhesive disease noted on her most recent laparoscopy.    She has always had heavy bleeding.  She reports that it is getting significantly worse with large clots and leaking through to her pant.  Over the last year, the pain has been getting worse.  She now has an IUD and her last period was light. She gets pain a couple days before her period that lasts a couple days after.  She does have some pain with certain stretches that lingers. No pain with intercourse. Urination normal.  Bowel movements are normal, 2 per day. She has tried OCPs in the past but has been trying to avoid them lately.     8/10/2017 robotic bilateral ovarian cystectomies for large endometriomas with Dr. Perrin.    2025 pelvic ultrasound: Uterus 4.9 x 4.7 x 5.6 cm.  Heterogenous with increased fascial vascularity endometrium to 10.5 mm thick.  Right ovary normal.  Left ovary multicystic measuring 5.6 x 5.2 x 3.7 cm consistent with likely endometrioma.    6/3/2025 diagnostic laparoscopy, bilateral tubal dye study, operative hysteroscopy, dilation and curettage, Liletta IUD placement.  Extensive pelvic adhesive disease was noted with ovaries fixed in the pelvis secondary to endometriosis.   Path: Endometrium without evidence of hyperplasia or malignancy    Screening History  Colonoscopy: Never  Mammogram: Never  Cervical cancer screening: up to date per patient    OB/Gynecologic History:  Deliveries: ,  x2    Cervical cancer screening: See screening history above. Additional details:   Regular screening:  "Yes  History of abnormal: No    Past Medical History:   Diagnosis Date    Anxiety     Seasonal allergies        Past Surgical History:   Procedure Laterality Date    ENT SURGERY      tonsillectomy,adenoidectomy, wisdom teeth removed    HERNIA REPAIR      umbilical    LAPAROSCOPIC TUBAL DYE STUDY Bilateral 8/10/2017    Procedure: LAPAROSCOPIC TUBAL DYE STUDY;;  Surgeon: Arabella Perrin MD;  Location:  OR        Social History     Tobacco Use    Smoking status: Never    Smokeless tobacco: Never   Substance Use Topics    Alcohol use: Yes    Drug use: No   Current living situation[place] 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      Family History   Problem Relation Age of Onset    Breast Cancer Maternal Aunt     Breast Cancer Paternal Aunt     Brain Cancer Paternal Uncle          Allergies  Allergies   Allergen Reactions    Adhesive Tape Unknown and Other (See Comments)     States she is sensitive to certain adhesives, raised red dell     States she is sensitive to certain adhesives, raised red dell    Gramineae Pollens Itching    Other [No Clinical Screening - See Comments] Rash     BANDAID       Current Outpatient Medications   Medication Sig Dispense Refill    cholecalciferol (VITAMIN D3) 125 mcg (5000 units) capsule Take 125 mcg by mouth daily.      valACYclovir (VALTREX) 1000 mg tablet TAKE 2 TABLETS BY MOUTH AT FIRST SIGN OF ERUPTION. THEN 2 TABLETS BY MOUTH EVERY 12 HOURS UNTIL GONE.       No current facility-administered medications for this visit.       Physical Exam:   /85   Pulse 73   Resp 16   Ht 1.727 m (5' 8\")   Wt 56.7 kg (125 lb)   LMP 06/06/2025   SpO2 100%   BMI 19.01 kg/m    GENERAL: alert and no distress  EYES: Eyes grossly normal to inspection.  No discharge or erythema, or obvious scleral/conjunctival abnormalities.  RESP: No audible wheeze, cough, or visible cyanosis.    SKIN: Visible skin clear. No significant rash, abnormal pigmentation or " lesions.  NEURO: Cranial nerves grossly intact.  Mentation and speech appropriate for age.  PSYCH: Appropriate affect, tone, and pace of words     Labs/Pathology:   As above    Imaging:   As above      Assessment:  Simi Yang is a 34 year old patient with a history of stage IV endometriosis, now with a left complex ovarian mass.  She was referred to me due to extensive adhesive disease noted on her most recent laparoscopy.    Plan:   Discussed the pathophysiology of endometriosis in detail with the patient.  Patient desires the option for future fertility.  She does seem to get good relief of her pain and heavy bleeding when she is on hormonal therapy.  She is currently on the Liletta IUD and had a very light period with reduction in her bleeding and pain.  She did have good relief on the OCP as well but has been trying to avoid hormones recently.  I discussed that the IUD will likely not prevent further endometriotic lesions from forming and that we may have more success with OCPs or GnRH analogs.  We did discuss the most definitive option of complete hysterectomy with BSO.  Discussed early surgical menopause in detail with the patient and its associated risks.  Given her desire to maintain the possibility of fertility in the future, I would recommend a robotic assisted left ovarian cystectomy.  Discussed that this may need to include a full left oophorectomy and possible salpingectomy depending on adhesive disease.  Discussed the possibility that the lesion is growing into the colon, in which case this would require colon resection with reanastomosis.  Discussed the unlikely possibility of a diverting ostomy.  Reviewed that we could consider OCPs postoperatively to potentially reduce the risk of recurrence.  Discussed the risks of surgery in detail and anticipated perioperative course.  The patient is getting a opinion at [facility] and will call if she decides she wants to have surgery with me.    Paolo  MD Lore

## 2025-06-24 ENCOUNTER — ONCOLOGY VISIT (OUTPATIENT)
Dept: ONCOLOGY | Facility: CLINIC | Age: 35
End: 2025-06-24
Attending: OBSTETRICS & GYNECOLOGY
Payer: COMMERCIAL

## 2025-06-24 VITALS
BODY MASS INDEX: 18.94 KG/M2 | OXYGEN SATURATION: 100 % | WEIGHT: 125 LBS | RESPIRATION RATE: 16 BRPM | DIASTOLIC BLOOD PRESSURE: 85 MMHG | HEIGHT: 68 IN | SYSTOLIC BLOOD PRESSURE: 130 MMHG | HEART RATE: 73 BPM

## 2025-06-24 DIAGNOSIS — R19.00 PELVIC MASS: Primary | ICD-10-CM

## 2025-06-24 PROCEDURE — 99204 OFFICE O/P NEW MOD 45 MIN: CPT | Performed by: OBSTETRICS & GYNECOLOGY

## 2025-06-24 PROCEDURE — 99213 OFFICE O/P EST LOW 20 MIN: CPT | Performed by: OBSTETRICS & GYNECOLOGY

## 2025-06-24 RX ORDER — VALACYCLOVIR HYDROCHLORIDE 1 G/1
TABLET, FILM COATED ORAL
COMMUNITY
Start: 2025-02-12

## 2025-06-24 ASSESSMENT — PAIN SCALES - GENERAL: PAINLEVEL_OUTOF10: NO PAIN (0)

## 2025-06-24 NOTE — LETTER
2025      Simi Yang  [address]  [place] [address]      Dear Colleague,    Thank you for referring your patient, Simi Yang, to the [facility]. Please see a copy of my visit note below.    Gynecologic Oncology Clinic - New Patient    Referring provider:    Lydia Glez MD  [address] [place] [address]    Patient: Simi Yang  : 1990    Date of Visit: 2025     Reason for visit: Ovarian mass    History of Present Illness:  Simi Yang is a 34 year old patient with a history of stage IV endometriosis, now with a left complex ovarian mass.  She was referred to me due to extensive adhesive disease noted on her most recent laparoscopy.    She has always had heavy bleeding.  She reports that it is getting significantly worse with large clots and leaking through to her pant.  Over the last year, the pain has been getting worse.  She now has an IUD and her last period was light. She gets pain a couple days before her period that lasts a couple days after.  She does have some pain with certain stretches that lingers. No pain with intercourse. Urination normal.  Bowel movements are normal, 2 per day. She has tried OCPs in the past but has been trying to avoid them lately.     8/10/2017 robotic bilateral ovarian cystectomies for large endometriomas with Dr. Perrin.    2025 pelvic ultrasound: Uterus 4.9 x 4.7 x 5.6 cm.  Heterogenous with increased fascial vascularity endometrium to 10.5 mm thick.  Right ovary normal.  Left ovary multicystic measuring 5.6 x 5.2 x 3.7 cm consistent with likely endometrioma.    6/3/2025 diagnostic laparoscopy, bilateral tubal dye study, operative hysteroscopy, dilation and curettage, Liletta IUD placement.  Extensive pelvic adhesive disease was noted with ovaries fixed in the pelvis secondary to endometriosis.   Path: Endometrium without evidence of hyperplasia or malignancy    Screening  "History  Colonoscopy: Never  Mammogram: Never  Cervical cancer screening: up to date per patient    OB/Gynecologic History:  Deliveries: ,  x2    Cervical cancer screening: See screening history above. Additional details:   Regular screening: Yes  History of abnormal: No    Past Medical History:   Diagnosis Date     Anxiety      Seasonal allergies        Past Surgical History:   Procedure Laterality Date     ENT SURGERY      tonsillectomy,adenoidectomy, wisdom teeth removed     HERNIA REPAIR      umbilical     LAPAROSCOPIC TUBAL DYE STUDY Bilateral 8/10/2017    Procedure: LAPAROSCOPIC TUBAL DYE STUDY;;  Surgeon: Arabella Perrin MD;  Location:  OR        Social History     Tobacco Use     Smoking status: Never     Smokeless tobacco: Never   Substance Use Topics     Alcohol use: Yes     Drug use: No   Current living situation[place] 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      Family History   Problem Relation Age of Onset     Breast Cancer Maternal Aunt      Breast Cancer Paternal Aunt      Brain Cancer Paternal Uncle          Allergies  Allergies   Allergen Reactions     Adhesive Tape Unknown and Other (See Comments)     States she is sensitive to certain adhesives, raised red dell     States she is sensitive to certain adhesives, raised red dell     Gramineae Pollens Itching     Other [No Clinical Screening - See Comments] Rash     BANDAID       Current Outpatient Medications   Medication Sig Dispense Refill     cholecalciferol (VITAMIN D3) 125 mcg (5000 units) capsule Take 125 mcg by mouth daily.       valACYclovir (VALTREX) 1000 mg tablet TAKE 2 TABLETS BY MOUTH AT FIRST SIGN OF ERUPTION. THEN 2 TABLETS BY MOUTH EVERY 12 HOURS UNTIL GONE.       No current facility-administered medications for this visit.       Physical Exam:   /85   Pulse 73   Resp 16   Ht 1.727 m (5' 8\")   Wt 56.7 kg (125 lb)   LMP 2025   SpO2 100%   BMI 19.01 kg/m    GENERAL: alert and " no distress  EYES: Eyes grossly normal to inspection.  No discharge or erythema, or obvious scleral/conjunctival abnormalities.  RESP: No audible wheeze, cough, or visible cyanosis.    SKIN: Visible skin clear. No significant rash, abnormal pigmentation or lesions.  NEURO: Cranial nerves grossly intact.  Mentation and speech appropriate for age.  PSYCH: Appropriate affect, tone, and pace of words     Labs/Pathology:   As above    Imaging:   As above      Assessment:  Simi Yang is a 34 year old patient with a history of stage IV endometriosis, now with a left complex ovarian mass.  She was referred to me due to extensive adhesive disease noted on her most recent laparoscopy.    Plan:   Discussed the pathophysiology of endometriosis in detail with the patient.  Patient desires the option for future fertility.  She does seem to get good relief of her pain and heavy bleeding when she is on hormonal therapy.  She is currently on the Liletta IUD and had a very light period with reduction in her bleeding and pain.  She did have good relief on the OCP as well but has been trying to avoid hormones recently.  I discussed that the IUD will likely not prevent further endometriotic lesions from forming and that we may have more success with OCPs or GnRH analogs.  We did discuss the most definitive option of complete hysterectomy with BSO.  Discussed early surgical menopause in detail with the patient and its associated risks.  Given her desire to maintain the possibility of fertility in the future, I would recommend a robotic assisted left ovarian cystectomy.  Discussed that this may need to include a full left oophorectomy and possible salpingectomy depending on adhesive disease.  Discussed the possibility that the lesion is growing into the colon, in which case this would require colon resection with reanastomosis.  Discussed the unlikely possibility of a diverting ostomy.  Reviewed that we could consider OCPs  postoperatively to potentially reduce the risk of recurrence.  Discussed the risks of surgery in detail and anticipated perioperative course.  The patient is getting a opinion at [facility] and will call if she decides she wants to have surgery with me.    Paolo Torrez MD               Again, thank you for allowing me to participate in the care of your patient.        Sincerely,        Paolo Torrez MD    Electronically signed

## 2025-06-24 NOTE — NURSING NOTE
"Oncology Rooming Note    June 24, 2025 8:14 AM   Simi Yang is a 34 year old female who presents for:    Chief Complaint   Patient presents with    Oncology Clinic Visit     New Patient      Initial Vitals: /85   Pulse 73   Resp 16   Ht 1.727 m (5' 8\")   Wt 56.7 kg (125 lb)   LMP 06/06/2025   SpO2 100%   BMI 19.01 kg/m   Estimated body mass index is 19.01 kg/m  as calculated from the following:    Height as of this encounter: 1.727 m (5' 8\").    Weight as of this encounter: 56.7 kg (125 lb). Body surface area is 1.65 meters squared.  No Pain (0) Comment: Data Unavailable   Patient's last menstrual period was 06/06/2025.  Allergies reviewed: Yes  Medications reviewed: Yes    Medications: Medication refills not needed today.  Pharmacy name entered into [employer]: [facility] [address]    Frailty Screening:   Is the patient here for a new oncology consult visit in cancer care? 2. No    PHQ9:  Did this patient require a PHQ9?: No      Clinical concerns: New Patient       Brooke Muhammad MA            "

## (undated) DEVICE — GLOVE PROTEXIS BLUE W/NEU-THERA 7.0  2D73EB70

## (undated) DEVICE — SUCTION IRR TRUMPET VALVE LAP ASU1201

## (undated) DEVICE — SYR 50ML LL W/O NDL 309653

## (undated) DEVICE — EVAC SYSTEM CLEAR FLOW SC082500

## (undated) DEVICE — SOL NACL 0.9% INJ 1000ML BAG 2B1324X

## (undated) DEVICE — TUBING SUCTION 12"X1/4" N612

## (undated) DEVICE — SUCTION CANISTER MEDIVAC LINER 3000ML W/LID 65651-530

## (undated) DEVICE — POUCH TISSUE RETRIEVAL LAP 10MM 2.21" INTRO TRS100SB2

## (undated) DEVICE — GLOVE PROTEXIS MICRO 6.5  2D73PM65

## (undated) DEVICE — ESU GROUND PAD UNIVERSAL W/O CORD

## (undated) DEVICE — NDL INSUFFLATION 14GA STEP S100000

## (undated) DEVICE — DRAPE CV SPLIT II 147X106" 9158

## (undated) DEVICE — DAVINCI XI DRAPE COLUMN 470341

## (undated) DEVICE — SPONGE LAP 18X18" X8435

## (undated) DEVICE — PACK DAVINCI GYN SMA15GDFS1

## (undated) DEVICE — ADH LIQUID MASTISOL TOPICAL VIAL 2-3ML 0523-48

## (undated) DEVICE — Device

## (undated) DEVICE — DAVINCI XI NDL DRIVER LARGE 470006

## (undated) DEVICE — SU MONOCRYL 4-0 PS-2 18" UND Y496G

## (undated) DEVICE — TUBING IV EXTENSION SET 34"

## (undated) DEVICE — ENDO TROCAR 12MM VERSASTEP VS101012P

## (undated) DEVICE — KIT PATIENT POSITIONING PIGAZZI LATEX FREE 40580

## (undated) DEVICE — BARRIER INTERCEED 5X6" 4350XL

## (undated) DEVICE — DAVINCI XI ESU FCP BIPOLAR MARYLAND 470172

## (undated) DEVICE — DAVINCI XI GRASPER ENDOWRIST PROGRASP 470093

## (undated) DEVICE — SPONGE RAY-TEC 4X4" 7317

## (undated) DEVICE — GLOVE BIOGEL PI ULTRATOUCH G SZ 6.5 42165

## (undated) DEVICE — DAVINCI XI SEAL UNIVERSAL 5-8MM 470361

## (undated) DEVICE — DAVINCI XI MONOPOLAR SCISSORS HOT SHEARS 8MM 470179

## (undated) DEVICE — DRSG STERI STRIP 1X5" R1548

## (undated) DEVICE — SU VICRYL 3-0 SH 27" J316H

## (undated) DEVICE — DAVINCI HOT SHEARS TIP COVER  400180

## (undated) DEVICE — SOL NACL 0.9% IRRIG 1000ML BOTTLE 07138-09

## (undated) DEVICE — DAVINCI XI DRAPE ARM 470015

## (undated) DEVICE — PREP CHLORAPREP 26ML TINTED ORANGE  260815

## (undated) DEVICE — SU VICRYL 0 CT-1 27" J340H

## (undated) DEVICE — LINEN TOWEL PACK X5 5464

## (undated) RX ORDER — KETOROLAC TROMETHAMINE 30 MG/ML
INJECTION, SOLUTION INTRAMUSCULAR; INTRAVENOUS
Status: DISPENSED
Start: 2017-08-10

## (undated) RX ORDER — PROPOFOL 10 MG/ML
INJECTION, EMULSION INTRAVENOUS
Status: DISPENSED
Start: 2017-08-10

## (undated) RX ORDER — OXYCODONE AND ACETAMINOPHEN 5; 325 MG/1; MG/1
TABLET ORAL
Status: DISPENSED
Start: 2017-08-10

## (undated) RX ORDER — CEFAZOLIN SODIUM 1 G/3ML
INJECTION, POWDER, FOR SOLUTION INTRAMUSCULAR; INTRAVENOUS
Status: DISPENSED
Start: 2017-08-10

## (undated) RX ORDER — CEFAZOLIN SODIUM 2 G/100ML
INJECTION, SOLUTION INTRAVENOUS
Status: DISPENSED
Start: 2017-08-10

## (undated) RX ORDER — ONDANSETRON 2 MG/ML
INJECTION INTRAMUSCULAR; INTRAVENOUS
Status: DISPENSED
Start: 2017-08-10

## (undated) RX ORDER — BUPIVACAINE HYDROCHLORIDE AND EPINEPHRINE 5; 5 MG/ML; UG/ML
INJECTION, SOLUTION EPIDURAL; INTRACAUDAL; PERINEURAL
Status: DISPENSED
Start: 2017-08-10

## (undated) RX ORDER — DEXAMETHASONE SODIUM PHOSPHATE 4 MG/ML
INJECTION, SOLUTION INTRA-ARTICULAR; INTRALESIONAL; INTRAMUSCULAR; INTRAVENOUS; SOFT TISSUE
Status: DISPENSED
Start: 2017-08-10

## (undated) RX ORDER — FENTANYL CITRATE 50 UG/ML
INJECTION, SOLUTION INTRAMUSCULAR; INTRAVENOUS
Status: DISPENSED
Start: 2017-08-10

## (undated) RX ORDER — LIDOCAINE HYDROCHLORIDE 20 MG/ML
INJECTION, SOLUTION EPIDURAL; INFILTRATION; INTRACAUDAL; PERINEURAL
Status: DISPENSED
Start: 2017-08-10